# Patient Record
Sex: FEMALE | Race: OTHER | NOT HISPANIC OR LATINO | URBAN - METROPOLITAN AREA
[De-identification: names, ages, dates, MRNs, and addresses within clinical notes are randomized per-mention and may not be internally consistent; named-entity substitution may affect disease eponyms.]

---

## 2020-12-11 VITALS
TEMPERATURE: 98 F | HEART RATE: 74 BPM | SYSTOLIC BLOOD PRESSURE: 157 MMHG | DIASTOLIC BLOOD PRESSURE: 74 MMHG | RESPIRATION RATE: 16 BRPM | OXYGEN SATURATION: 96 %

## 2020-12-11 RX ORDER — CHLORHEXIDINE GLUCONATE 213 G/1000ML
1 SOLUTION TOPICAL ONCE
Refills: 0 | Status: DISCONTINUED | OUTPATIENT
Start: 2020-12-14 | End: 2020-12-14

## 2020-12-11 NOTE — H&P ADULT - HISTORY OF PRESENT ILLNESS
****SKELETON****        COVID: _____  CARDS: Dr. Artis  PHARM: _____    Pt is 51yo ____ F w/ FHx of CAD and PMHx of _____HTN, HLD??___ who presented to her cardiologist, Dr. Artis, endorsing chest pain ________. The pain has been present x1year, and is described as left sided chest pressure related to exertion, with radiation to L arm and is 5/10 intensity. CP episodes last ~ 2minutes and self resolve.     Pt denies _________    STRESS ECHO (11/30/2020): shows evidence of moderate hypokinesis in mid-basal anterior wall. ECHO (11/30/2020): LVEF 65%, Grade I DD, mild LVH.     In light of patient’s risk factors, CCS Class II Anginal symptoms, and stress echocardiogram, patient now presents to [facility] for cardiac catheterization with possible intervention if clinically indicated.    COVID: 12/11/2020 @ [facility]  CARDS: Dr. Artis  PHARM: [facility]    Pt is 49yo F w/ FHx of CAD and PMHx of HTN, HLDwho presented to her cardiologist, Dr. Artis, endorsing chest pain and REY. The pain has been present x1year, and is described as left sided chest pressure related to exertion, with radiation to L arm and is 5/10 intensity. CP episodes last ~ 2minutes and self resolve. She reports having REY upon ambulation of 1-2 blocks. Pt denies palpitations, orthopnea, PND, dizziness/syncope, N/V, fever/chills.     STRESS ECHO (11/30/2020): shows evidence of moderate hypokinesis in mid-basal anterior wall.   ECHO (11/30/2020): LVEF 65%, Grade I DD, mild LVH.     In light of patient’s risk factors, CCS Class III Anginal symptoms, and stress echocardiogram, patient now presents to [facility] for cardiac catheterization with possible intervention if clinically indicated.    COVID: 12/11/2020 @ [facility] [place] neg (HIE)   CARDS: Dr. Artis  PHARM: [facility]    49yo F w/ FHx of CAD and PMHx of HTN, HLDwho presented to her cardiologist, Dr. Artis, endorsing chest pain and REY. The pain has been present x1year, and is described as left sided chest pressure related to exertion, with radiation to L arm and is 5/10 intensity. CP episodes last ~ 2minutes and self resolve. She reports having REY upon ambulation of 1-2 blocks. Pt denies palpitations, orthopnea, PND, dizziness/syncope, N/V, fever/chills. STRESS ECHO (11/30/2020): shows evidence of moderate hypokinesis in mid-basal anterior wall. ECHO (11/30/2020): LVEF 65%, Grade I DD, mild LVH.  In light of patient’s risk factors, CCS Class III Anginal symptoms, and stress echocardiogram, patient now presents to [facility] for cardiac catheterization with possible intervention if clinically indicated.

## 2020-12-11 NOTE — H&P ADULT - NSHPLABSRESULTS_GEN_ALL_CORE
12.8   10.98 )-----------( 319      ( 14 Dec 2020 15:25 )             39.0   12-14    141  |  105  |  12  ----------------------------<  95  4.2   |  24  |  0.65    Ca    9.6      14 Dec 2020 15:25    TPro  7.6  /  Alb  4.4  /  TBili  0.4  /  DBili  x   /  AST  19  /  ALT  17  /  AlkPhos  102  12-14  PT/INR - ( 14 Dec 2020 15:25 )   PT: 13.3 sec;   INR: 1.11          PTT - ( 14 Dec 2020 15:25 )  PTT:34.9 sec

## 2020-12-11 NOTE — H&P ADULT - ASSESSMENT
Risks & benefits of procedure and alternative therapy have been explained to the patient including but not limited to: allergic reaction, bleeding w/possible need for blood transfusion, infection, renal and vascular compromise, limb damage, arrhythmia, stroke, vessel dissection/perforation, Myocardial infarction, emergent CABG. Informed consent obtained and in chart.       51yo F w/ FHx of CAD and PMHx of HTN, HLD who presents to [facility] for cardiac catheterization with possible intervention if clinically indicated 2/2 patient’s risk factors, CCS Class III Anginal symptoms, and abnormal stress echocardiogram    H/H . Pt denies bleeding, GI bleeding, hematemesis, hematuria, BRBPR or melena . ASA … and Plavix … pre-cath.  Cr. IV NS@ cc/hr pre-cath.    Risks & benefits of procedure and alternative therapy have been explained to the patient including but not limited to: allergic reaction, bleeding w/possible need for blood transfusion, infection, renal and vascular compromise, limb damage, arrhythmia, stroke, vessel dissection/perforation, Myocardial infarction, emergent CABG. Informed consent obtained and in chart         49yo F w/ FHx of CAD and PMHx of HTN, HLD who presents to [facility] for cardiac catheterization with possible intervention if clinically indicated 2/2 patient’s risk factors, CCS Class III Anginal symptoms, and abnormal stress echocardiogram    H/H . Pt denies bleeding, GI bleeding, hematemesis, hematuria, BRBPR or melena . Pt loaded with  mg PO X 1 and Plavix 600 mg PO x 1 pre-cath.  Cr. IV NS@ 75 cc/hr pre-cath.    Risks & benefits of procedure and alternative therapy have been explained to the patient including but not limited to: allergic reaction, bleeding w/possible need for blood transfusion, infection, renal and vascular compromise, limb damage, arrhythmia, stroke, vessel dissection/perforation, Myocardial infarction, emergent CABG. Informed consent obtained and in chart         49yo F w/ FHx of CAD and PMHx of HTN, HLD who presents to [facility] for cardiac catheterization with possible intervention if clinically indicated 2/2 patient’s risk factors, CCS Class III Anginal symptoms, and abnormal stress echocardiogram    H/H 12/39 . Pt denies bleeding, GI bleeding, hematemesis, hematuria, BRBPR or melena . Pt loaded with  mg PO X 1 and Plavix 600 mg PO x 1 pre-cath.  Cr. 0.65, IV NS@ 75 cc/hr pre-cath.  WBC 10.98; denies any URI/UTI sx and afebrile.  pt c/o mid-sternal 2/10 non-radiating chest pain in holding area; EKG with no ST-T changes; Dr. mohr aware.     Risks & benefits of procedure and alternative therapy have been explained to the patient including but not limited to: allergic reaction, bleeding w/possible need for blood transfusion, infection, renal and vascular compromise, limb damage, arrhythmia, stroke, vessel dissection/perforation, Myocardial infarction, emergent CABG. Informed consent obtained and in chart

## 2020-12-14 ENCOUNTER — OUTPATIENT (OUTPATIENT)
Dept: OUTPATIENT SERVICES | Facility: HOSPITAL | Age: 50
LOS: 1 days | Discharge: NURSING, REHAB OR LONG-TERM CARE | End: 2020-12-14
Payer: MEDICAID

## 2020-12-14 LAB
A1C WITH ESTIMATED AVERAGE GLUCOSE RESULT: 6.5 % — HIGH (ref 4–5.6)
ALBUMIN SERPL ELPH-MCNC: 4.4 G/DL — SIGNIFICANT CHANGE UP (ref 3.3–5)
ALP SERPL-CCNC: 102 U/L — SIGNIFICANT CHANGE UP (ref 40–120)
ALT FLD-CCNC: 17 U/L — SIGNIFICANT CHANGE UP (ref 10–45)
ANION GAP SERPL CALC-SCNC: 12 MMOL/L — SIGNIFICANT CHANGE UP (ref 5–17)
APTT BLD: 34.9 SEC — SIGNIFICANT CHANGE UP (ref 27.5–35.5)
AST SERPL-CCNC: 19 U/L — SIGNIFICANT CHANGE UP (ref 10–40)
BASOPHILS # BLD AUTO: 0.08 K/UL — SIGNIFICANT CHANGE UP (ref 0–0.2)
BASOPHILS NFR BLD AUTO: 0.7 % — SIGNIFICANT CHANGE UP (ref 0–2)
BILIRUB SERPL-MCNC: 0.4 MG/DL — SIGNIFICANT CHANGE UP (ref 0.2–1.2)
BUN SERPL-MCNC: 12 MG/DL — SIGNIFICANT CHANGE UP (ref 7–23)
CALCIUM SERPL-MCNC: 9.6 MG/DL — SIGNIFICANT CHANGE UP (ref 8.4–10.5)
CHLORIDE SERPL-SCNC: 105 MMOL/L — SIGNIFICANT CHANGE UP (ref 96–108)
CHOLEST SERPL-MCNC: 133 MG/DL — SIGNIFICANT CHANGE UP
CK MB CFR SERPL CALC: 1.4 NG/ML — SIGNIFICANT CHANGE UP (ref 0–6.7)
CK SERPL-CCNC: 83 U/L — SIGNIFICANT CHANGE UP (ref 25–170)
CO2 SERPL-SCNC: 24 MMOL/L — SIGNIFICANT CHANGE UP (ref 22–31)
CREAT SERPL-MCNC: 0.65 MG/DL — SIGNIFICANT CHANGE UP (ref 0.5–1.3)
EOSINOPHIL # BLD AUTO: 0.09 K/UL — SIGNIFICANT CHANGE UP (ref 0–0.5)
EOSINOPHIL NFR BLD AUTO: 0.8 % — SIGNIFICANT CHANGE UP (ref 0–6)
ESTIMATED AVERAGE GLUCOSE: 140 MG/DL — HIGH (ref 68–114)
GLUCOSE SERPL-MCNC: 95 MG/DL — SIGNIFICANT CHANGE UP (ref 70–99)
HCT VFR BLD CALC: 39 % — SIGNIFICANT CHANGE UP (ref 34.5–45)
HDLC SERPL-MCNC: 39 MG/DL — LOW
HGB BLD-MCNC: 12.8 G/DL — SIGNIFICANT CHANGE UP (ref 11.5–15.5)
IMM GRANULOCYTES NFR BLD AUTO: 0.3 % — SIGNIFICANT CHANGE UP (ref 0–1.5)
INR BLD: 1.11 — SIGNIFICANT CHANGE UP (ref 0.88–1.16)
LIPID PNL WITH DIRECT LDL SERPL: 60 MG/DL — SIGNIFICANT CHANGE UP
LYMPHOCYTES # BLD AUTO: 19.7 % — SIGNIFICANT CHANGE UP (ref 13–44)
LYMPHOCYTES # BLD AUTO: 2.16 K/UL — SIGNIFICANT CHANGE UP (ref 1–3.3)
MCHC RBC-ENTMCNC: 28.1 PG — SIGNIFICANT CHANGE UP (ref 27–34)
MCHC RBC-ENTMCNC: 32.8 GM/DL — SIGNIFICANT CHANGE UP (ref 32–36)
MCV RBC AUTO: 85.5 FL — SIGNIFICANT CHANGE UP (ref 80–100)
MONOCYTES # BLD AUTO: 0.7 K/UL — SIGNIFICANT CHANGE UP (ref 0–0.9)
MONOCYTES NFR BLD AUTO: 6.4 % — SIGNIFICANT CHANGE UP (ref 2–14)
NEUTROPHILS # BLD AUTO: 7.92 K/UL — HIGH (ref 1.8–7.4)
NEUTROPHILS NFR BLD AUTO: 72.1 % — SIGNIFICANT CHANGE UP (ref 43–77)
NON HDL CHOLESTEROL: 94 MG/DL — SIGNIFICANT CHANGE UP
NRBC # BLD: 0 /100 WBCS — SIGNIFICANT CHANGE UP (ref 0–0)
PLATELET # BLD AUTO: 319 K/UL — SIGNIFICANT CHANGE UP (ref 150–400)
POTASSIUM SERPL-MCNC: 4.2 MMOL/L — SIGNIFICANT CHANGE UP (ref 3.5–5.3)
POTASSIUM SERPL-SCNC: 4.2 MMOL/L — SIGNIFICANT CHANGE UP (ref 3.5–5.3)
PROT SERPL-MCNC: 7.6 G/DL — SIGNIFICANT CHANGE UP (ref 6–8.3)
PROTHROM AB SERPL-ACNC: 13.3 SEC — SIGNIFICANT CHANGE UP (ref 10.6–13.6)
RBC # BLD: 4.56 M/UL — SIGNIFICANT CHANGE UP (ref 3.8–5.2)
RBC # FLD: 13.4 % — SIGNIFICANT CHANGE UP (ref 10.3–14.5)
SODIUM SERPL-SCNC: 141 MMOL/L — SIGNIFICANT CHANGE UP (ref 135–145)
TRIGL SERPL-MCNC: 169 MG/DL — HIGH
WBC # BLD: 10.98 K/UL — HIGH (ref 3.8–10.5)
WBC # FLD AUTO: 10.98 K/UL — HIGH (ref 3.8–10.5)

## 2020-12-14 PROCEDURE — 93458 L HRT ARTERY/VENTRICLE ANGIO: CPT

## 2020-12-14 PROCEDURE — C1887: CPT

## 2020-12-14 PROCEDURE — 82550 ASSAY OF CK (CPK): CPT

## 2020-12-14 PROCEDURE — 85610 PROTHROMBIN TIME: CPT

## 2020-12-14 PROCEDURE — 82553 CREATINE MB FRACTION: CPT

## 2020-12-14 PROCEDURE — 80061 LIPID PANEL: CPT

## 2020-12-14 PROCEDURE — 93005 ELECTROCARDIOGRAM TRACING: CPT

## 2020-12-14 PROCEDURE — C1894: CPT

## 2020-12-14 PROCEDURE — 83036 HEMOGLOBIN GLYCOSYLATED A1C: CPT

## 2020-12-14 PROCEDURE — 93010 ELECTROCARDIOGRAM REPORT: CPT

## 2020-12-14 PROCEDURE — 85025 COMPLETE CBC W/AUTO DIFF WBC: CPT

## 2020-12-14 PROCEDURE — C1769: CPT

## 2020-12-14 PROCEDURE — 99202 OFFICE O/P NEW SF 15 MIN: CPT

## 2020-12-14 PROCEDURE — 85730 THROMBOPLASTIN TIME PARTIAL: CPT

## 2020-12-14 PROCEDURE — 80053 COMPREHEN METABOLIC PANEL: CPT

## 2020-12-14 RX ORDER — SODIUM CHLORIDE 9 MG/ML
500 INJECTION INTRAMUSCULAR; INTRAVENOUS; SUBCUTANEOUS
Refills: 0 | Status: DISCONTINUED | OUTPATIENT
Start: 2020-12-14 | End: 2020-12-14

## 2020-12-14 RX ORDER — ASPIRIN/CALCIUM CARB/MAGNESIUM 324 MG
325 TABLET ORAL ONCE
Refills: 0 | Status: COMPLETED | OUTPATIENT
Start: 2020-12-14 | End: 2020-12-14

## 2020-12-14 RX ORDER — ASPIRIN/CALCIUM CARB/MAGNESIUM 324 MG
0 TABLET ORAL
Qty: 0 | Refills: 0 | DISCHARGE

## 2020-12-14 RX ORDER — LOSARTAN/HYDROCHLOROTHIAZIDE 100MG-25MG
1 TABLET ORAL
Qty: 0 | Refills: 0 | DISCHARGE

## 2020-12-14 RX ORDER — ATORVASTATIN CALCIUM 80 MG/1
1 TABLET, FILM COATED ORAL
Qty: 0 | Refills: 0 | DISCHARGE

## 2020-12-14 RX ORDER — CHOLECALCIFEROL (VITAMIN D3) 125 MCG
0 CAPSULE ORAL
Qty: 0 | Refills: 0 | DISCHARGE

## 2020-12-14 RX ORDER — METOPROLOL TARTRATE 50 MG
1 TABLET ORAL
Qty: 0 | Refills: 0 | DISCHARGE

## 2020-12-14 RX ORDER — CLOPIDOGREL BISULFATE 75 MG/1
600 TABLET, FILM COATED ORAL ONCE
Refills: 0 | Status: COMPLETED | OUTPATIENT
Start: 2020-12-14 | End: 2020-12-14

## 2020-12-14 RX ADMIN — Medication 325 MILLIGRAM(S): at 16:42

## 2020-12-14 RX ADMIN — CLOPIDOGREL BISULFATE 600 MILLIGRAM(S): 75 TABLET, FILM COATED ORAL at 16:42

## 2020-12-14 RX ADMIN — SODIUM CHLORIDE 75 MILLILITER(S): 9 INJECTION INTRAMUSCULAR; INTRAVENOUS; SUBCUTANEOUS at 16:42

## 2020-12-14 NOTE — CONSULT NOTE ADULT - SUBJECTIVE AND OBJECTIVE BOX
Preventive Cardiology Consultation Note    CHIEF COMPLAINT: s/p cardiac catheterization requiring cardiovascular prevention optimization and education    HISTORY OF PRESENT ILLNESS: 51yo F w/ FHx of CAD and PMHx of HTN, HLD who presented to her cardiologist, Dr. Artis, c/o chest pain and REY when walking 1-2 block for the past year. Patient is now s/p cardiac cath 12/14/2020: LM: Normal, LAD: luminal irregularities, LCx: luminal irregularities, RCA: normal, EF: 60%, EDP: 15 no AS/ MR.    Review of systems otherwise negative.     Lifestyle History:  Mediterranean Diet Score (9 question survey) was 5.   (8-9: optimal, 6-7: near-optimal, 4-5: suboptimal, 0-3: markedly suboptimal)  Exercise: Patient reports exercising at a moderate level for 30-60 minutes per week.   Smoking: Patient denies any history of smoking.   Stress: Patient denies any stress.     PAST MEDICAL & SURGICAL HISTORY:    FAMILY HISTORY:   Patient denies any first degree family history of premature CAD or CVA.     Allergies:   No Known Allergies      HOME MEDICATIONS:   aspirin 81 mg oral tablet: orally once a day (14 Dec 2020 15:51)  Lipitor 20 mg oral tablet: 1 tab(s) orally once a day (14 Dec 2020 15:51)  losartan-hydrochlorothiazide 50mg-12.5mg oral tablet: 1 tab(s) orally once a day (14 Dec 2020 15:51)  Toprol-XL 25 mg oral tablet, extended release: 1 tab(s) orally once a day (14 Dec 2020 15:51)  Vitamin D3 50,000 intl units (1250 mcg) oral capsule: orally once a day (14 Dec 2020 15:51)      PHYSICAL EXAM:  · Temp (F)	98 Degrees F  · Temp (C)	36.7 Degrees C  · Heart Rate	74 /min  · Respiration Rate (breaths/min)	16 /min  · BP Systolic	157 mm Hg  · BP Diastolic	74 mm Hg  · BP Noninvasive Mean	101 mm Hg  · SpO2 (%)	96 %  · O2 Delivery/Oxygen Delivery Method	room air  	  Gen- awake, conversive  Head-NCAT; eyes: no corneal arcus noted b/l; no xanthelasmas   Neck- no thyromegaly, no cervical LAD, no JVD, no carotid bruit b/l  Respiratory- good air entry b/l, no WRR  Cardiovascular- S1S2, RRR, no MRG appr, no LE edema b/l, distal pulses 2+ b/l  Gastrointestinal- no HSM, no masses  Neurology- moves all extremities, no focal deficits  Psych- normal affect, non-depressed mood  Skin- no lesions, no rashes, no xanthomas     LABS:	                        12.8   10.98 )-----------( 319      ( 14 Dec 2020 15:25 )             39.0     12-14    141  |  105  |  12  ----------------------------<  95  4.2   |  24  |  0.65    Ca    9.6      14 Dec 2020 15:25    TPro  7.6  /  Alb  4.4  /  TBili  0.4  /  DBili  x   /  AST  19  /  ALT  17  /  AlkPhos  102  12-14      Cholesterol, Serum: 133 mg/dL (12-14 @ 15:25)  HDL Cholesterol, Serum: 39 mg/dL (12-14 @ 15:25)  Triglycerides, Serum: 169 mg/dL (12-14 @ 15:25)  LDL Cholesterol, Serum: 60 mg/dL (12-14 @ 15:25)  A1c 6.5%      ASSESSMENT/RECOMMENDATIONS: 	  Patient's dietary, exercise and overall lifestyle habits were reviewed. The concept of atherosclerosis and its systemic nature was discussed with a focus on the need to get all cardiovascular risk factors to goal. At this time, I would like to make the following recommendations to optimize atherosclerotic risk factors.     RECOMMENDATIONS:   Anti-platelet Therapy: APT per interventionalist recommendation.   Lipid Therapy: Patient is currently taking atorvastatin 20mg daily and is compliant and tolerating it well. We believe this is an appropriate medication at this time, as her current LDL-C is at goal and lifestyle modifications will likely benefit her further.   Hypertension: Blood pressures during this stay were well-controlled.   Mediterranean Diet Score is 5. Some suggestions include continue incorporating 2 or more servings per day of vegetables, fruits, and whole grains. Increase intake of fish and legumes/beans to 2 or more servings per week. Aim to increase intake of healthy fats, such as olive oil and avocados, and have a handful of nuts/seeds most days. Reduce red/processed meat consumption to 2 or fewer times per week.   Exercise: Recommended gradually increasing activity to 30-45 minutes most days of the week once cleared by referring cardiologist.   Medication Adherence: Patient has no issues with adherence at this time.   Smoking: This patient is a non-smoker.   Obesity/Overweight: The patient was advised about specific mechanisms such as reduced portions and increased activity for efforts toward weight loss.   Glucometabolic State: Based on HbA1c 6.5% today, patient is in the diabetic range. We would recommend following up with her PCP to confirm her status and initiate treatment as necessary.   Sleep Apnea: The patient is at low risk for sleep apnea.   Psychological Stress: The patient appears to be coping with stressors well at this time.     Thank you for the opportunity to see this patient. Please feel free to contact Prevention if there are any questions, or if you feel that your patient would benefit from continued follow-up visits with the Program.    Chela Meneses, [facility]  Cardiovascular Prevention     Darling Barcenas MD  System Director, Cardiovascular Prevention

## 2020-12-14 NOTE — PROGRESS NOTE ADULT - SUBJECTIVE AND OBJECTIVE BOX
Interventional Cardiology PA SDA Discharge Note    Patient without complaints. Ambulated and voided without difficulties    Afebrile, VSS    Ext:    	       Right Radial : No hematoma, nobleeding, dressing; C/D/I      Pulses:    intact RAD to baseline     A/P:  51yo F w/ FHx of CAD and PMHx of HTN, HLDwho presented to her cardiologist, Dr. Artis, endorsing chest pain and REY. The pain has been present x1year, and is described as left sided chest pressure related to exertion, with radiation to L arm and is 5/10 intensity. CP episodes last ~ 2minutes and self resolve. She reports having REY upon ambulation of 1-2 blocks. Pt denies palpitations, orthopnea, PND, dizziness/syncope, N/V, fever/chills. STRESS ECHO (11/30/2020): shows evidence of moderate hypokinesis in mid-basal anterior wall. ECHO (11/30/2020): LVEF 65%, Grade I DD, mild LVH.  In light of patient’s risk factors, CCS Class III Anginal symptoms, and stress echocardiogram, patient now presents to [facility] for cardiac catheterization with possible intervention if clinically indicated.     s/p cardiac cath 12/14/2020: LM: Normal, LAD: luminal irregularities, LCx: luminal irregularities, RCA: normal, EF: 60%, EDP: 15 no AS/ MR; Right TR due at 8 PM    1.	Stable for discharge as per attending Dr. Artis after bed rest, pt voids, groin/wrist stable and 30 minutes of ambulation.  2.	Follow-up with Cardiologist, Dr. Artis in 1-2 weeks  3.	Discharged forms signed and copies in chart

## 2023-05-29 NOTE — H&P ADULT - SPO2 (%)
----- Message from Martina Antonio sent at 5/29/2023  1:07 PM CDT -----  Contact: Alanis/sister  Pt sister is calling in regards to getting the appt on 05/31 rescheduled it.Please call back at [phone]        Thanks  RAMONA     [address] Pregnancy And Lactation Text: This medication is Pregnancy Category X and is known to cause serious birth defects. It is unknown if this medication is excreted in breast milk but breast feeding should be avoided.